# Patient Record
Sex: MALE | Race: WHITE | URBAN - METROPOLITAN AREA
[De-identification: names, ages, dates, MRNs, and addresses within clinical notes are randomized per-mention and may not be internally consistent; named-entity substitution may affect disease eponyms.]

---

## 2017-04-10 ENCOUNTER — IMPORTED ENCOUNTER (OUTPATIENT)
Dept: URBAN - METROPOLITAN AREA CLINIC 1 | Facility: CLINIC | Age: 52
End: 2017-04-10

## 2017-04-10 PROBLEM — H52.4: Noted: 2017-04-10

## 2017-04-10 PROBLEM — H52.13: Noted: 2017-04-10

## 2017-04-10 PROCEDURE — 92014 COMPRE OPH EXAM EST PT 1/>: CPT

## 2017-04-10 PROCEDURE — 92015 DETERMINE REFRACTIVE STATE: CPT

## 2017-04-10 NOTE — PATIENT DISCUSSION
1. Myopia: Rx was given for correction if indicated and requested. 2. Presbyopia3. Dry Eyes w/ PEK OU - Recommend ATs BID OU (sample of Systane Ultra given) 4. Allergic Conjunctivitis OU - may use OTC Zaditor BID OU PRN 5. Cataract OU - ObserveReturn for an appointment in 1 year 36 with Dr. Harris Graham.

## 2018-04-26 ENCOUNTER — IMPORTED ENCOUNTER (OUTPATIENT)
Dept: URBAN - METROPOLITAN AREA CLINIC 1 | Facility: CLINIC | Age: 53
End: 2018-04-26

## 2018-04-26 PROBLEM — H52.4: Noted: 2018-04-26

## 2018-04-26 PROCEDURE — S0621 ROUTINE OPHTHALMOLOGICAL EXA: HCPCS

## 2018-04-26 NOTE — PATIENT DISCUSSION
1.  Presbyopia: Rx was given for corrective spectacles if indicated. 2.  Dry Eyes w/ PEK OU - Recommend ATs BID OU 3. Allergic Conjunctivitis OU - may use OTC Zaditor BID OU PRN 4. Cataract OU - Observe5. Return for an appointment in 1 year for 40. with Dr. Mariya Barrett.

## 2019-03-22 ENCOUNTER — OFFICE VISIT (OUTPATIENT)
Dept: ORTHOPEDIC SURGERY | Age: 54
End: 2019-03-22

## 2019-03-22 VITALS — DIASTOLIC BLOOD PRESSURE: 73 MMHG | SYSTOLIC BLOOD PRESSURE: 157 MMHG | RESPIRATION RATE: 19 BRPM | HEART RATE: 66 BPM

## 2019-03-22 DIAGNOSIS — M54.2 NECK PAIN: ICD-10-CM

## 2019-03-22 DIAGNOSIS — M54.12 RIGHT CERVICAL RADICULOPATHY: Primary | ICD-10-CM

## 2019-03-22 RX ORDER — METHYLPREDNISOLONE 4 MG/1
4 TABLET ORAL
Qty: 1 DOSE PACK | Refills: 0 | Status: SHIPPED | OUTPATIENT
Start: 2019-03-22

## 2019-03-22 RX ORDER — TOPIRAMATE 25 MG/1
TABLET ORAL
Qty: 90 TAB | Refills: 1 | Status: SHIPPED | OUTPATIENT
Start: 2019-03-22

## 2019-03-22 RX ORDER — PRAVASTATIN SODIUM 20 MG/1
20 TABLET ORAL
COMMUNITY

## 2019-03-22 RX ORDER — LOSARTAN POTASSIUM 100 MG/1
100 TABLET ORAL DAILY
COMMUNITY

## 2019-03-22 NOTE — PROGRESS NOTES
[facility] [address] 
[address], [address] Phone: [phone] Fax: [phone] INITIAL CONSULTATION Patient: Charla Velazquez Md                MRN: [id number]       SSN: [id number] YOB: 1965        AGE: 47 y.o. SEX: male There is no height or weight on file to calculate BMI. PCP: No primary care provider on file. 
03/22/19 Chief Complaint Patient presents with  Neck Pain NEW PATIENT  Arm Pain HISTORY OF PRESENT ILLNESS, RADIOGRAPHS, and PLAN:  
 
 
 
HISTORY OF PRESENT ILLNESS:  Mr. Sincere Quezada is seen today. He has mild chronic neck ache. For the past 2 weeks he has had a more significant cervical radiculopathy. It is very positional, especially when he is at [place] [employer] at work. It initially seemed to subside but then came back with a vengeance. PHYSICAL EXAMINATION:  The pain in his neck radiates down the left arm in a C6 distribution. He has subtle weakness in his arms and his brachioradialis. No reflex changes. No real sensory change. No  dexterity change. No balance change. RADIOGRAPHS:  Plain x-rays of his neck demonstrate spondylitic change at C5-6 with uncinate  spurring and degenerative collapse. ASSESSMENT/PLAN:  Pain has been significant. I discussed the matter at length with him, given his neurologic symptoms and degree of pain and unresponsiveness to conservative interventions. He has done some physical therapy on his own. We are going to obtain for him a Medrol Dosepak and place him on some Topamax and obtain an MRI of his cervical spine. I will see him back following his MRI. We will see if the neuropathic and steroids calm things down. History reviewed. No pertinent past medical history. History reviewed. No pertinent family history. Current Outpatient Medications Medication Sig Dispense Refill  losartan (COZAAR) 100 mg tablet Take 100 mg by mouth daily.  pravastatin (PRAVACHOL) 20 mg tablet Take 20 mg by mouth nightly. Not on File History reviewed. No pertinent surgical history. History reviewed. No pertinent past medical history. Social History Socioeconomic History  Marital status: UNKNOWN Spouse name: Not on file  Number of children: Not on file  Years of education: Not on file  Highest education level: Not on file Occupational History  Not on file Social Needs  Financial resource strain: Not on file  Food insecurity:  
  Worry: Not on file Inability: Not on file  Transportation needs:  
  Medical: Not on file Non-medical: Not on file Tobacco Use  Smoking status: Never Smoker  Smokeless tobacco: Never Used Substance and Sexual Activity  Alcohol use: Not on file  Drug use: Not on file  Sexual activity: Not on file Lifestyle  Physical activity:  
  Days per week: Not on file Minutes per session: Not on file  Stress: Not on file Relationships  Social connections:  
  Talks on phone: Not on file Gets together: Not on file Attends Hinduism service: Not on file Active member of club or organization: Not on file Attends meetings of clubs or organizations: Not on file Relationship status: Not on file  Intimate partner violence:  
  Fear of current or ex partner: Not on file Emotionally abused: Not on file Physically abused: Not on file Forced sexual activity: Not on file Other Topics Concern [address] Service Not Asked  Blood Transfusions Not Asked  Caffeine Concern Not Asked  Occupational Exposure Not Asked [place] Hazards Not Asked  Sleep Concern Not Asked  Stress Concern Not Asked  Weight Concern Not Asked  Special Diet Not Asked  Back Care Not Asked  Exercise Not Asked  Bike Helmet Not Asked  Seat Belt Not Asked  Self-Exams Not Asked Social History Narrative  Not on file REVIEW OF SYSTEMS: 
 CONSTITUTIONAL SYMPTOMS:  Negative. EYES:  Negative. EARS, NOSE, THROAT AND MOUTH:  Negative. CARDIOVASCULAR:  Negative. RESPIRATORY:  Negative. GENITOURINARY: Per HPI. GASTROINTESTINAL:  Per HPI. INTEGUMENTARY (SKIN AND/OR BREAST):  Negative. MUSCULOSKELETAL: Per HPI. ENDOCRINE/RHEUMATOLOGIC:  Negative. NEUROLOGICAL:  Per HPI. HEMATOLOGIC/LYMPHATIC:  Negative. ALLERGIC/IMMUNOLOGIC:  Negative. PSYCHIATRIC:  Negative. PHYSICAL EXAMINATION:  
Visit Vitals /73 (BP 1 Location: Left arm, BP Patient Position: Sitting) Pulse 66 Resp 19 PAIN SCALE: 0 - No pain/10 CONSTITUTIONAL: The patient is in no apparent distress and is alert and oriented x 3. HEENT: Normocephalic. Hearing grossly intact. NECK: Supple and symmetric. no tenderness, or masses were felt. RESPIRATORY: No labored breathing. CARDIOVASCULAR: The carotid pulses were normal. Peripheral pulses were 2+. CHEST: Normal AP diameter and normal contour without any kyphoscoliosis. LYMPHATIC: No lymphadenopathy was appreciated in the neck, axillae or groin. SKIN:  Negative for scars, rashes, lesions, or ulcers on the right upper, right lower, left upper, left lower and trunk. NEUROLOGICAL: Alert and oriented x 3. Ambulation without assistive device. FWB. EXTREMITIES:  See musculoskeletal. 
MUSCULOSKELETAL: 
? Head and Neck:  Negative for misalignment, asymmetry, crepitation, defects, tenderness masses or effusions. ? Left Upper Extremity: Inspection, percussion and palpation performed. Dickeys sign is negative. ? Right Upper Extremity: RHD. Radiating pain. Numbness in thumb. Slight weakness. Inspection, percussion and palpation performed. Dickeys sign is negative. ? Spine, Ribs and Pelvis: Inspection, percussion and palpation performed.  Negative for misalignment, asymmetry, crepitation, defects, tenderness masses or effusions. ? Left Lower Extremity: Inspection, percussion and palpation performed. Negative straight leg raise. ? Right Lower Extremity: Inspection, percussion and palpation performed. Negative straight leg raise. SPINE EXAM:  
 
Cervical spine: Neck is midline. Normal muscle tone. No focal atrophy is noted. Lumbar spine: No rash, ecchymosis, or gross obliquity. No focal atrophy is noted. ASSESSMENT 
  ICD-10-CM ICD-9-CM 1. Right cervical radiculopathy M54.12 723.4 MRI CERV SPINE WO CONT  
   topiramate (TOPAMAX) 25 mg tablet  
   methylPREDNISolone (MEDROL DOSEPACK) 4 mg tablet 2. Neck pain M54.2 723.1 AMB POC XRAY, SPINE, CERVICAL; 4+ VIE Written by Gerardo Liao, as dictated by Nellie Wagoner MD. 
 
I, Dr. Nellie Wagoner MD, confirm that all documentation is accurate.

## 2019-03-22 NOTE — PATIENT INSTRUCTIONS
Neck Pain: Care Instructions Your Care Instructions You can have neck pain anywhere from the bottom of your head to the top of your shoulders. It can spread to the upper back or arms. Injuries, painting a ceiling, sleeping with your neck twisted, staying in one position for too long, and many other activities can cause neck pain. Most neck pain gets better with home care. Your doctor may recommend medicine to relieve pain or relax your muscles. He or she may suggest exercise and physical therapy to increase flexibility and relieve stress. You may need to wear a special (cervical) collar to support your neck for a day or two. Follow-up care is a key part of your treatment and safety. Be sure to make and go to all appointments, and call your doctor if you are having problems. It's also a good idea to know your test results and keep a list of the medicines you take. How can you care for yourself at home? · Try using a heating pad on a low or medium setting for 15 to 20 minutes every 2 or 3 hours. Try a warm shower in place of one session with the heating pad. · You can also try an ice pack for 10 to 15 minutes every 2 to 3 hours. Put a thin cloth between the ice and your skin. · Take pain medicines exactly as directed. ? If the doctor gave you a prescription medicine for pain, take it as prescribed. ? If you are not taking a prescription pain medicine, ask your doctor if you can take an over-the-counter medicine. · If your doctor recommends a cervical collar, wear it exactly as directed. When should you call for help? Call your doctor now or seek immediate medical care if: 
  · You have new or worsening numbness in your arms, buttocks or legs.  
  · You have new or worsening weakness in your arms or legs. (This could make it hard to stand up.)  
  · You lose control of your bladder or bowels.  
 Watch closely for changes in your health, and be sure to contact your doctor if:   · Your neck pain is getting worse.  
  · You are not getting better after 1 week.  
  · You do not get better as expected. Where can you learn more? Go to http://di-jared.info/. Enter [id number] in the search box to learn more about \"Neck Pain: Care Instructions. \" Current as of: September 20, 2018 Content Version: 11.9 © 9146-0309 [employer]. Care instructions adapted under license by [employer] (which disclaims liability or warranty for this information). If you have questions about a medical condition or this instruction, always ask your healthcare professional. [address] any warranty or liability for your use of this information.

## 2019-04-03 ENCOUNTER — HOSPITAL ENCOUNTER (OUTPATIENT)
Dept: MRI IMAGING | Age: 54
Discharge: HOME | End: 2019-04-03
Attending: ORTHOPAEDIC SURGERY
Payer: COMMERCIAL

## 2019-04-03 DIAGNOSIS — M54.12 RIGHT CERVICAL RADICULOPATHY: ICD-10-CM

## 2019-04-03 PROCEDURE — 72141 MRI NECK SPINE W/O DYE: CPT

## 2019-05-21 ENCOUNTER — IMPORTED ENCOUNTER (OUTPATIENT)
Dept: URBAN - METROPOLITAN AREA CLINIC 1 | Facility: CLINIC | Age: 54
End: 2019-05-21

## 2019-05-21 PROBLEM — H52.223: Noted: 2019-05-21

## 2019-05-21 PROBLEM — H52.13: Noted: 2019-05-21

## 2019-05-21 PROBLEM — H52.4: Noted: 2019-05-21

## 2019-05-21 PROCEDURE — S0621 ROUTINE OPHTHALMOLOGICAL EXA: HCPCS

## 2019-05-21 NOTE — PATIENT DISCUSSION
1. Myopia OU -- Finalized Glasses MRx was given to patient today for correction if indicated and requested2. Astigmatism OU3. Presbyopia OU4. Cataracts OU -- Observe 5. Dry Eyes w/ PEK OU -- Recommend continue the frequent use of OTC AT's BID-QID OU Routinely6. Allergic Conjunctivitis OU -- Recommend the use of OTC Zaditor BID OU PRN Itching  Return for an appointment in 1 YR for a 40 OU with Dr. Radha Lainez.

## 2019-10-14 ENCOUNTER — HOSPITAL ENCOUNTER (OUTPATIENT)
Dept: LAB | Age: 54
Discharge: HOME | End: 2019-10-14
Payer: COMMERCIAL

## 2019-10-14 LAB
ALBUMIN SERPL-MCNC: 4.2 G/DL (ref 3.4–5)
ALBUMIN/GLOB SERPL: 1.4 {RATIO} (ref 0.8–1.7)
ALP SERPL-CCNC: 85 U/L (ref 45–117)
ALT SERPL-CCNC: 34 U/L (ref 16–61)
ANION GAP SERPL CALC-SCNC: 4 MMOL/L (ref 3–18)
AST SERPL-CCNC: 16 U/L (ref 10–38)
BILIRUB SERPL-MCNC: 0.6 MG/DL (ref 0.2–1)
BUN SERPL-MCNC: 18 MG/DL (ref 7–18)
BUN/CREAT SERPL: 17 (ref 12–20)
CALCIUM SERPL-MCNC: 8.8 MG/DL (ref 8.5–10.1)
CHLORIDE SERPL-SCNC: 104 MMOL/L (ref 100–111)
CHOLEST SERPL-MCNC: 183 MG/DL
CO2 SERPL-SCNC: 30 MMOL/L (ref 21–32)
CREAT SERPL-MCNC: 1.06 MG/DL (ref 0.6–1.3)
ERYTHROCYTE [DISTWIDTH] IN BLOOD BY AUTOMATED COUNT: 11.9 % (ref 11.6–14.5)
GLOBULIN SER CALC-MCNC: 2.9 G/DL (ref 2–4)
GLUCOSE SERPL-MCNC: 81 MG/DL (ref 74–99)
HCT VFR BLD AUTO: 42 % (ref 36–48)
HDLC SERPL-MCNC: 61 MG/DL (ref 40–60)
HDLC SERPL: 3 {RATIO} (ref 0–5)
HGB BLD-MCNC: 15.2 G/DL (ref 13–16)
LDLC SERPL CALC-MCNC: 108.8 MG/DL (ref 0–100)
LIPID PROFILE,FLP: ABNORMAL
MCH RBC QN AUTO: 32.8 PG (ref 24–34)
MCHC RBC AUTO-ENTMCNC: 36.2 G/DL (ref 31–37)
MCV RBC AUTO: 90.7 FL (ref 74–97)
PLATELET # BLD AUTO: 239 K/UL (ref 135–420)
PMV BLD AUTO: 10 FL (ref 9.2–11.8)
POTASSIUM SERPL-SCNC: 4.1 MMOL/L (ref 3.5–5.5)
PROT SERPL-MCNC: 7.1 G/DL (ref 6.4–8.2)
PSA SERPL-MCNC: 1.5 NG/ML (ref 0–4)
RBC # BLD AUTO: 4.63 M/UL (ref 4.7–5.5)
SODIUM SERPL-SCNC: 138 MMOL/L (ref 136–145)
TRIGL SERPL-MCNC: 66 MG/DL (ref ?–150)
VLDLC SERPL CALC-MCNC: 13.2 MG/DL
WBC # BLD AUTO: 6.2 K/UL (ref 4.6–13.2)

## 2019-10-14 PROCEDURE — 84153 ASSAY OF PSA TOTAL: CPT

## 2019-10-14 PROCEDURE — 85027 COMPLETE CBC AUTOMATED: CPT

## 2019-10-14 PROCEDURE — 80061 LIPID PANEL: CPT

## 2019-10-14 PROCEDURE — 36415 COLL VENOUS BLD VENIPUNCTURE: CPT

## 2019-10-14 PROCEDURE — 80053 COMPREHEN METABOLIC PANEL: CPT

## 2020-02-21 NOTE — PATIENT DISCUSSION
Risks, Benefits and Alternatives were discussed with patient at length for Cataract Surgery. Visual symptoms are consistent with Cataract findings on examination and current refraction no longer provides satisfactory vision. Patients with signs of poor dilation on exam may necessitate intraocular manipulation of pupil and can be associated with surgical complications. Patient understands and desires surgery. All questions answered. Risks, Benefits and Alternatives discussed at length for IOL placement. Doctor suggests Nevin Covarrubias desires TBD. Patient will need to wear glasses for best corrected vision at distance and near.

## 2020-05-21 ENCOUNTER — IMPORTED ENCOUNTER (OUTPATIENT)
Dept: URBAN - METROPOLITAN AREA CLINIC 1 | Facility: CLINIC | Age: 55
End: 2020-05-21

## 2020-05-21 PROBLEM — H52.4: Noted: 2020-05-21

## 2020-05-21 PROBLEM — H52.13: Noted: 2020-05-21

## 2020-05-21 PROCEDURE — S0621 ROUTINE OPHTHALMOLOGICAL EXA: HCPCS

## 2020-05-21 NOTE — PATIENT DISCUSSION
1. Myopia w/ Presbyopia -- Rx was given for corrective spectacles if indicated. 2.  Dry Eyes w/ PEK OU -- Cont ATs BID OU. 3.  Allergic Conjunctivitis OU -- Cont OTC Zaditor BID OU PRN. 4.  Cataract OU -- Observe. Return for an appointment in 1 year for 40. with Dr. Radha Lainez.

## 2021-05-26 ENCOUNTER — IMPORTED ENCOUNTER (OUTPATIENT)
Dept: URBAN - METROPOLITAN AREA CLINIC 1 | Facility: CLINIC | Age: 56
End: 2021-05-26

## 2021-05-26 PROBLEM — H52.4: Noted: 2021-05-26

## 2021-05-26 PROBLEM — H52.223: Noted: 2021-05-26

## 2021-05-26 PROBLEM — H44.23: Noted: 2021-05-26

## 2021-05-26 PROCEDURE — S0621 ROUTINE OPHTHALMOLOGICAL EXA: HCPCS

## 2021-05-26 NOTE — PATIENT DISCUSSION
1. Myopia OU: Rx was given for correction if indicated and requested. 2. Presbyopia OU3. Astigmatism OU4. LYNDA w/ PEK OU - Recommend ATs BID OU. 5.  Allergic Conjunctivitis OU - Cont OTC Zaditor BID OU PRN. 6.  Cataract OU - Observe. Return for an appointment in 1 year 36 with Dr. Abigail Howell.

## 2022-04-02 ASSESSMENT — KERATOMETRY
OD_AXISANGLE_DEGREES: 008
OS_AXISANGLE2_DEGREES: 078
OD_AXISANGLE2_DEGREES: 098
OS_AXISANGLE_DEGREES: 078
OS_K1POWER_DIOPTERS: 45.75
OS_K2POWER_DIOPTERS: 48.00
OD_K1POWER_DIOPTERS: 45.50
OD_K2POWER_DIOPTERS: 48.50

## 2022-04-02 ASSESSMENT — VISUAL ACUITY
OS_CC: J1
OS_CC: J1
OS_SC: 20/20-1
OD_CC: J1
OS_SC: 20/20-2
OD_SC: 20/20
OS_SC: 20/20-1
OD_SC: 20/20
OD_CC: J1+1
OS_CC: J1
OS_SC: 20/20-2
OS_SC: 20/20
OS_CC: J1
OD_CC: J1
OD_SC: 20/20

## 2022-04-02 ASSESSMENT — TONOMETRY
OS_IOP_MMHG: 17
OS_IOP_MMHG: 16
OS_IOP_MMHG: 16
OD_IOP_MMHG: 16
OD_IOP_MMHG: 17
OS_IOP_MMHG: 16
OS_IOP_MMHG: 16
OD_IOP_MMHG: 16

## 2023-04-27 ENCOUNTER — COMPREHENSIVE EXAM (OUTPATIENT)
Dept: URBAN - METROPOLITAN AREA CLINIC 1 | Facility: CLINIC | Age: 58
End: 2023-04-27

## 2023-04-27 DIAGNOSIS — H52.223: ICD-10-CM

## 2023-04-27 DIAGNOSIS — Z01.00: ICD-10-CM

## 2023-04-27 DIAGNOSIS — H52.4: ICD-10-CM

## 2023-04-27 DIAGNOSIS — H52.13: ICD-10-CM

## 2023-04-27 PROCEDURE — 92014 COMPRE OPH EXAM EST PT 1/>: CPT

## 2023-04-27 PROCEDURE — 92015 DETERMINE REFRACTIVE STATE: CPT

## 2023-04-27 ASSESSMENT — VISUAL ACUITY
OS_CC: 20/25-1
OS_CC: J1
OD_CC: 20/25-1
OD_CC: J1

## 2023-04-27 ASSESSMENT — TONOMETRY
OD_IOP_MMHG: 17
OS_IOP_MMHG: 16

## 2025-06-16 ENCOUNTER — COMPREHENSIVE EXAM (OUTPATIENT)
Age: 60
End: 2025-06-16

## 2025-06-16 DIAGNOSIS — H52.223: ICD-10-CM

## 2025-06-16 DIAGNOSIS — Z01.00: ICD-10-CM

## 2025-06-16 DIAGNOSIS — H52.13: ICD-10-CM

## 2025-06-16 DIAGNOSIS — H52.4: ICD-10-CM

## 2025-06-16 PROCEDURE — 92014 COMPRE OPH EXAM EST PT 1/>: CPT

## 2025-06-16 PROCEDURE — 92015 DETERMINE REFRACTIVE STATE: CPT
